# Patient Record
Sex: FEMALE | Race: WHITE | NOT HISPANIC OR LATINO | Employment: OTHER | URBAN - METROPOLITAN AREA
[De-identification: names, ages, dates, MRNs, and addresses within clinical notes are randomized per-mention and may not be internally consistent; named-entity substitution may affect disease eponyms.]

---

## 2023-10-13 PROBLEM — C44.719 BASAL CELL CARCINOMA OF SKIN OF LEFT LOWER LIMB, INCLUDING HIP: Status: ACTIVE | Noted: 2023-03-02

## 2023-10-13 PROBLEM — H01.131 ECZEMATOUS DERMATITIS OF RIGHT UPPER EYELID: Status: ACTIVE | Noted: 2023-03-02

## 2023-10-13 PROBLEM — L81.4 OTHER MELANIN HYPERPIGMENTATION: Status: ACTIVE | Noted: 2023-03-02

## 2023-10-13 PROBLEM — L02.828 FURUNCLE OF OTHER SITES: Status: ACTIVE | Noted: 2023-03-02

## 2023-10-13 PROBLEM — L91.8 OTHER HYPERTROPHIC DISORDERS OF THE SKIN: Status: ACTIVE | Noted: 2023-03-02

## 2023-10-13 PROBLEM — L57.0 ACTINIC KERATOSIS: Status: ACTIVE | Noted: 2023-03-02

## 2023-10-13 PROBLEM — D18.01 HEMANGIOMA OF SKIN AND SUBCUTANEOUS TISSUE: Status: ACTIVE | Noted: 2023-03-02

## 2023-10-13 PROBLEM — L82.1 OTHER SEBORRHEIC KERATOSIS: Status: ACTIVE | Noted: 2023-03-02

## 2023-10-13 PROBLEM — Z85.828 PERSONAL HISTORY OF OTHER MALIGNANT NEOPLASM OF SKIN: Status: ACTIVE | Noted: 2023-03-02

## 2023-10-13 PROBLEM — D23.9 OTHER BENIGN NEOPLASM OF SKIN, UNSPECIFIED: Status: ACTIVE | Noted: 2023-03-02

## 2023-10-13 PROBLEM — Z85.820 PERSONAL HISTORY OF MALIGNANT MELANOMA OF SKIN: Status: ACTIVE | Noted: 2023-03-02

## 2023-10-13 PROBLEM — D49.2 NEOPLASM OF UNSPECIFIED BEHAVIOR OF BONE, SOFT TISSUE, AND SKIN: Status: ACTIVE | Noted: 2023-03-02

## 2023-10-13 PROBLEM — D48.5 NEOPLASM OF UNCERTAIN BEHAVIOR OF SKIN: Status: ACTIVE | Noted: 2023-03-02

## 2023-10-13 PROBLEM — L90.5 SCAR CONDITION AND FIBROSIS OF SKIN: Status: ACTIVE | Noted: 2023-03-02

## 2023-10-13 PROBLEM — L82.0 INFLAMED SEBORRHEIC KERATOSIS: Status: ACTIVE | Noted: 2023-03-02

## 2023-10-13 RX ORDER — HYDROCORTISONE 25 MG/G
1 CREAM TOPICAL
COMMUNITY
Start: 2021-09-14

## 2023-10-13 RX ORDER — TACROLIMUS 1 MG/G
1 OINTMENT TOPICAL
COMMUNITY
Start: 2019-05-07

## 2023-10-16 ENCOUNTER — OFFICE VISIT (OUTPATIENT)
Dept: DERMATOLOGY | Facility: CLINIC | Age: 78
End: 2023-10-16
Payer: MEDICARE

## 2023-10-16 DIAGNOSIS — Z12.83 SCREENING EXAM FOR SKIN CANCER: ICD-10-CM

## 2023-10-16 DIAGNOSIS — Z12.83 ENCOUNTER FOR SCREENING FOR MALIGNANT NEOPLASM OF SKIN: Primary | ICD-10-CM

## 2023-10-16 DIAGNOSIS — L81.1 MELASMA: ICD-10-CM

## 2023-10-16 DIAGNOSIS — L82.1 SEBORRHEIC KERATOSIS: ICD-10-CM

## 2023-10-16 DIAGNOSIS — L81.4 LENTIGO: ICD-10-CM

## 2023-10-16 DIAGNOSIS — D18.01 HEMANGIOMA OF SKIN: ICD-10-CM

## 2023-10-16 PROCEDURE — 99214 OFFICE O/P EST MOD 30 MIN: CPT | Performed by: NURSE PRACTITIONER

## 2023-10-17 NOTE — PROGRESS NOTES
Subjective     Gail L Mendelson is a 78 y.o. female who presents for the following: Skin Check (Presents to office today for FBSE. Denies pain, itching or bleeding. PMHX significant for MM and NMSC. No further complaints.).     Review of Systems:  No other skin or systemic complaints other than what is documented elsewhere in the note.    The following portions of the chart were reviewed this encounter and updated as appropriate:          Skin Cancer History  No skin cancer on file.      Specialty Problems          Dermatology Problems    Actinic keratosis    Basal cell carcinoma of skin of left lower limb, including hip    Eczematous dermatitis of right upper eyelid    Hemangioma of skin and subcutaneous tissue    Inflamed seborrheic keratosis    Neoplasm of uncertain behavior of skin    Other benign neoplasm of skin, unspecified    Other hypertrophic disorders of the skin    Other melanin hyperpigmentation    Other seborrheic keratosis    Personal history of malignant melanoma of skin    Personal history of other malignant neoplasm of skin    Scar condition and fibrosis of skin        Objective   Well appearing patient in no apparent distress; mood and affect are within normal limits.    A focused skin examination was performed. All findings within normal limits unless otherwise noted below.    Assessment/Plan   1. Encounter for screening for malignant neoplasm of skin    Related Procedures  Follow Up In Dermatology - Established Patient    2. Screening exam for skin cancer  Scattered benign lesions. Scar(s) clear no sign of recurrence.     No evidence of recurrence in scar and benign ROS.   Continue with regular total body skin exams.  ABCDEs of melanoma and atypical moles were discussed with the patient.  Patient was instructed to perform monthly self skin examination.  We recommended that the patient have regular full skin exams given an increased risk of subsequent skin cancers.  The patient was instructed to  use sun protective behaviors including use of broad spectrum sunscreens and sun protective clothing to reduce risk of skin cancers.    3. Seborrheic keratosis  Stuck on verrucous, tan-brown papules and plaques.      Although Seborrheic Keratoses can be troublesome and unsightly, they are entirely benign.  Removal of Seborrheic Keratoses is considered a cosmetic procedure. Removal is typically performed using liquid nitrogen cryotherapy.  Treatment of current lesions does not prevent the development of new Seborrheic Keratoses in the future.    4. Hemangioma of skin  Scattered cherry-red papule(s).    - A cherry hemangioma is a small macule (small, flat, smooth area) or papule (small, solid bump) formed from an overgrowth of tiny blood vessels in the skin. Cherry hemangiomas are characteristically red or purplish in color. They often first appear in middle adulthood and usually increase in number with age. Cherry hemangiomas are noncancerous (benign) and are common in adults.  - Try to avoid trauma to the lesion(s), which may cause irritation and bleeding of the area.  - If cosmetically bothersome or if they cause irritation or bleeding, cherry hemangiomas may be removed by cutting away the area (excision), burning away the area (electrocautery), freezing the area (cryosurgery), or via laser therapy.    5. Lentigo  Scattered tan macules in sun-exposed areas.    - A solar lentigo (plural, solar lentigines), also known as a sun-induced freckle or senile lentigo, is a dark (hyperpigmented) lesion caused by natural or artificial ultraviolet (UV) light. Solar lentigines may be single or multiple.   - Solar lentigines typically appear on areas exposed to natural or artificial UV light. They appear as well-defined, light brown to black, flat spots.   - Solar lentigines are benign, but they do indicate excessive sun exposure, a risk factor for the development of skin cancer.    6. Melasma  Left Melolabial Fold, Left Upper  Cutaneous Lip, Right Melolabial Fold, Right Upper Cutaneous Lip  Tan, evenly pigmented macules and patches    MELASMA  - This condition is a splotchy light or dark brown discoloration that occurs on the face and neck - most commonly on the cheeks, forehead, upper lip, and chin. It tends to occur in women who are pregnant or taking oral contraceptive pills and who live in [place] areas. However, it may occur in the absence of these factors, and it is sometimes seen in men. People with darker skin tone are more likely to develop melasma. The pigment develops slowly over time, with no signs of inflammation or irritation. The exact cause is unknown but hereditary, hormones, and sunlight exposure are certainly important factors. Cosmetics probably do not play a role.   - If melasma occurs in association with pregnancy or oral contraceptive pills, it may gradually fade after delivery or discontinuation of the pills. Unfortunately, in many women, it may take many months to improve and may not go away completely.   Treatment options:       - Sunscreen: The single most important thing you can do is protecting yourself from the sun. Sunlight will gradually cause darkening of the pigment.        - Bleaching cream: You may be prescribed a bleaching cream containing hydroquinone to help lighten the skin. Hydroquinone may cause some initial irritation when you first use the product, so start slowly- apply a small amount to the affected area on Monday and Friday at night, if you don’t problems after one week, increase to every other night. Eventually work your way to every night. Improvements may not be noticeable for several months, so be patient. It is important to continue sun protection when using these products, as any sunlight will likely darken your melasma and negate any improvement from the bleaching cream.        - Combination creams: Occasionally a combination cream containing hydroquinone and other ingredients such as  tretinoin, corticosteroids, or glycolic acid may be prescribed to enhance the skin-lightening effects. As with bleaching cream above, start slowly and continue to apply sunscreen during the day.        - Chemical peels: If bleaching cream is not effective or you desire quicker results, you may consider a series of chemical peels. Chemical peels are very effective for melasma but these procedures should be performed by a dermatologist. Complications such as worsening pigmentation can occur when the procedures are not performed properly and tailored to the patient’s skin type.        - Laser treatment: For patients who desire even quicker and more dramatic results, laser treatment with Intense Pulsed Light (IPL) and Fraxel have been shown to be effective. Multiple treatments are usually necessary. These treatments are associated with3-7 days recovery period.     Plan  - Start bleaching cream ([place] compound). Use as directed.   - Risks, benefits, side effects, alternatives and options were discussed with patient and the patient voiced understanding.

## 2024-04-15 ENCOUNTER — OFFICE VISIT (OUTPATIENT)
Dept: DERMATOLOGY | Facility: CLINIC | Age: 79
End: 2024-04-15
Payer: MEDICARE

## 2024-04-15 DIAGNOSIS — L81.4 LENTIGO: ICD-10-CM

## 2024-04-15 DIAGNOSIS — D18.01 HEMANGIOMA OF SKIN: ICD-10-CM

## 2024-04-15 DIAGNOSIS — Z12.83 SCREENING EXAM FOR SKIN CANCER: ICD-10-CM

## 2024-04-15 DIAGNOSIS — L91.8 SKIN TAG: ICD-10-CM

## 2024-04-15 DIAGNOSIS — L82.1 SEBORRHEIC KERATOSIS: ICD-10-CM

## 2024-04-15 DIAGNOSIS — Z12.83 ENCOUNTER FOR SCREENING FOR MALIGNANT NEOPLASM OF SKIN: Primary | ICD-10-CM

## 2024-04-15 PROCEDURE — 99213 OFFICE O/P EST LOW 20 MIN: CPT | Performed by: NURSE PRACTITIONER

## 2024-04-15 PROCEDURE — 1159F MED LIST DOCD IN RCRD: CPT | Performed by: NURSE PRACTITIONER

## 2024-04-15 PROCEDURE — 1160F RVW MEDS BY RX/DR IN RCRD: CPT | Performed by: NURSE PRACTITIONER

## 2024-04-15 NOTE — PROGRESS NOTES
Subjective     Gail L Mendelson is a 78 y.o. female who presents for the following: Skin Check (Patient presents to office today for FBSE. PMHX NMSC, MM. No complaints.).     Review of Systems:  No other skin or systemic complaints other than what is documented elsewhere in the note.    The following portions of the chart were reviewed this encounter and updated as appropriate:   Tobacco  Allergies  Meds  Problems  Med Hx  Surg Hx  Fam Hx         Skin Cancer History  No skin cancer on file.      Specialty Problems          Dermatology Problems    Actinic keratosis    Basal cell carcinoma of skin of left lower limb, including hip    Hemangioma of skin and subcutaneous tissue    Inflamed seborrheic keratosis    Neoplasm of uncertain behavior of skin    Other benign neoplasm of skin, unspecified    Other hypertrophic disorders of the skin    Other melanin hyperpigmentation    Other seborrheic keratosis    Personal history of malignant melanoma of skin    Personal history of other malignant neoplasm of skin    Scar condition and fibrosis of skin        Objective   Well appearing patient in no apparent distress; mood and affect are within normal limits.    A focused skin examination was performed. All findings within normal limits unless otherwise noted below.    Assessment/Plan   1. Encounter for screening for malignant neoplasm of skin    Related Procedures  Follow Up In Dermatology - Established Patient    2. Screening exam for skin cancer  Scattered benign lesions. Scar(s) clear no sign of recurrence.     No evidence of recurrence in scar and benign ROS.   Continue with regular total body skin exams.  ABCDEs of melanoma and atypical moles were discussed with the patient.  Patient was instructed to perform monthly self skin examination.  We recommended that the patient have regular full skin exams given an increased risk of subsequent skin cancers.  The patient was instructed to use sun protective behaviors  including use of broad spectrum sunscreens and sun protective clothing to reduce risk of skin cancers.    3. Seborrheic keratosis  Stuck on verrucous, tan-brown papules and plaques.      Although Seborrheic Keratoses can be troublesome and unsightly, they are entirely benign.  Removal of Seborrheic Keratoses is considered a cosmetic procedure. Removal is typically performed using liquid nitrogen cryotherapy.  Treatment of current lesions does not prevent the development of new Seborrheic Keratoses in the future.    4. Hemangioma of skin  Violaceous/red papule with maroon lagoons     - A cherry hemangioma is a small macule (small, flat, smooth area) or papule (small, solid bump) formed from an overgrowth of tiny blood vessels in the skin. Cherry hemangiomas are characteristically red or purplish in color. They often first appear in middle adulthood and usually increase in number with age. Cherry hemangiomas are noncancerous (benign) and are common in adults.  - Lesions are benign, reassured patient.     5. Lentigo  Scattered tan macules in sun-exposed areas.    A solar lentigo (plural, solar lentigines), sometimes called an age spot or liver spot, is a brown macule (small, flat, smooth area of skin) caused by chronic sun or artificial ultraviolet (UV) light exposure. There may be just one lentigo or there may be multiple. This type of lentigo is different from lentigo simplex (discussed separately) because it is caused by exposure to UV light. Solar lentigines are benign, but they do indicate excessive sun exposure, a risk factor for the development of skin cancer.  Lesions are benign, no treatment needed.     6. Skin tag  Neck - Anterior  Fleshy, skin-colored sessile and pedunculated papules.     Acrochordon (skin tag)  - The benign nature of the lesion was discussed with patient.   - A skin tag (acrochordon) is a common, possibly inherited condition that manifests as small, flesh-colored growths on a thin stalk.  Skin tags are benign lesions that can sometimes become irritated or traumatized.  - Skin tags are very common, and their incidence increases with age. Seen more often in people with growth hormone excess (acromegaly).   - Skin tags are most commonly found on the eyelids, neck, armpits, and groin area. They are flesh-colored growths on a thin stalk, ranging in size from small to large.  -Discussed nature of condition  -Multiple treatments in office are often needed  -Diligent at home therapy is often needed  -Cryotherapy today  -Possible side effects of liquid nitrogen treatment reviewed including formation of blisters, crusting, tenderness, scar, and discoloration which may be permanent.  -Patient advised to return the office for re-evaluation if the treated lesion(s) do not resolve within 4-6 weeks. Patient verbalizes understanding.

## 2024-10-07 ENCOUNTER — APPOINTMENT (OUTPATIENT)
Dept: DERMATOLOGY | Facility: CLINIC | Age: 79
End: 2024-10-07
Payer: MEDICARE

## 2024-10-07 DIAGNOSIS — L82.1 SEBORRHEIC KERATOSIS: ICD-10-CM

## 2024-10-07 DIAGNOSIS — L81.6 POIKILODERMA: ICD-10-CM

## 2024-10-07 DIAGNOSIS — L81.4 LENTIGO: ICD-10-CM

## 2024-10-07 DIAGNOSIS — Z12.83 ENCOUNTER FOR SCREENING FOR MALIGNANT NEOPLASM OF SKIN: Primary | ICD-10-CM

## 2024-10-07 DIAGNOSIS — D18.01 HEMANGIOMA OF SKIN: ICD-10-CM

## 2024-10-07 PROCEDURE — 99213 OFFICE O/P EST LOW 20 MIN: CPT | Performed by: NURSE PRACTITIONER

## 2024-10-07 PROCEDURE — 1160F RVW MEDS BY RX/DR IN RCRD: CPT | Performed by: NURSE PRACTITIONER

## 2024-10-07 PROCEDURE — 1159F MED LIST DOCD IN RCRD: CPT | Performed by: NURSE PRACTITIONER

## 2024-10-07 NOTE — PROGRESS NOTES
Subjective   Gail L Mendelson is a 79 y.o. female who presents for the following: Skin Check.  Skin Cancer Screening  She has a history of moderate sun exposure. She is in the sun occasionally. She uses sunscreen occasionally. She reports no skin symptoms. Her moles are not changing.    PMHX MM 1997.        Objective   Well appearing patient in no apparent distress; mood and affect are within normal limits.    A full examination was performed including scalp, head, eyes, ears, nose, lips, neck, chest, axillae, abdomen, back, buttocks, bilateral upper extremities, bilateral lower extremities, hands, feet, fingers, toes, fingernails, and toenails. All findings within normal limits unless otherwise noted below.    Scattered benign lesions    Generalized, Head - Anterior (Face)  Stuck on verrucous, tan-brown papules and plaques.      Violaceous/red papule with maroon lagoons     Scattered tan macules in sun-exposed areas.    Chest (Upper Torso, Anterior)  Mottled pigmentation and erythema on the lateral and anterior neck and the superior chest with fine telangiectasias.       Assessment/Plan   Encounter for screening for malignant neoplasm of skin    - Protective measures, such as avoiding skin exposure to sunlight during peak sun hours (10 AM to 3 PM), wearing protective clothing, and applying high-SPF sunscreen, are essential for reducing exposure to harmful ultraviolet (UV) light.  - Monthly self-examination of the skin is helpful to detect new lesions or changes in existing lesions.  - Discussed signs and symptoms of sun-related skin cancers.   - Make sure your moles are not signs of skin cancer (melanoma). Remember the ABCDEs of melanoma lesions:  A - Asymmetry: One half of the lesion does not mirror the other half.  B - Border: The borders are irregular or vague (indistinct).  C - Color: More than one color may be noted within the mole.  D - Diameter: Size greater than 6 mm (roughly the size of a pencil eraser)  may be concerning.  E - Evolving: Notable changes in the lesion over time are suspicious signs for skin cancer.    Seborrheic keratosis (2)  Head - Anterior (Face); Generalized    Although Seborrheic Keratoses can be troublesome and unsightly, they are entirely benign.  Removal of Seborrheic Keratoses is considered a cosmetic procedure. Removal is typically performed using liquid nitrogen cryotherapy.  Treatment of current lesions does not prevent the development of new Seborrheic Keratoses in the future.    Hemangioma of skin    - A cherry hemangioma is a small macule (small, flat, smooth area) or papule (small, solid bump) formed from an overgrowth of tiny blood vessels in the skin. Cherry hemangiomas are characteristically red or purplish in color. They often first appear in middle adulthood and usually increase in number with age. Cherry hemangiomas are noncancerous (benign) and are common in adults.  - Lesions are benign, reassured patient.     Lentigo    A solar lentigo (plural, solar lentigines), sometimes called an age spot or liver spot, is a brown macule (small, flat, smooth area of skin) caused by chronic sun or artificial ultraviolet (UV) light exposure. There may be just one lentigo or there may be multiple. This type of lentigo is different from lentigo simplex (discussed separately) because it is caused by exposure to UV light. Solar lentigines are benign, but they do indicate excessive sun exposure, a risk factor for the development of skin cancer.  Lesions are benign, no treatment needed.     Poikiloderma  Chest (Upper Torso, Anterior)    Poikiloderma of [place] is a common, long-lasting skin condition in adults with lighter skin colors. It is thought to be caused by long-term sun exposure. Poikiloderma of [place] appears as color changes in the skin due to sun damage to the neck and sometimes the upper-middle area of the chest.  To help prevent this condition from occurring or worsening, use  sunscreens, wear sun-protective clothing, and avoid peak sun hours (between 10 AM and 3 PM).    Follow up in 6 months for a total body skin exam. Please call me if there are any changes or development of concerning symptoms (lesion/skin condition is changing, bleeding, enlarging, or worsening).

## 2025-04-07 ENCOUNTER — APPOINTMENT (OUTPATIENT)
Dept: DERMATOLOGY | Facility: CLINIC | Age: 80
End: 2025-04-07
Payer: MEDICARE

## 2025-04-07 DIAGNOSIS — L81.4 LENTIGO: ICD-10-CM

## 2025-04-07 DIAGNOSIS — D18.01 HEMANGIOMA OF SKIN: ICD-10-CM

## 2025-04-07 DIAGNOSIS — L82.1 SEBORRHEIC KERATOSIS: ICD-10-CM

## 2025-04-07 DIAGNOSIS — L91.8 SKIN TAG: ICD-10-CM

## 2025-04-07 DIAGNOSIS — Z12.83 SCREENING EXAM FOR SKIN CANCER: Primary | ICD-10-CM

## 2025-04-07 PROCEDURE — 1159F MED LIST DOCD IN RCRD: CPT | Performed by: NURSE PRACTITIONER

## 2025-04-07 PROCEDURE — 99213 OFFICE O/P EST LOW 20 MIN: CPT | Performed by: NURSE PRACTITIONER

## 2025-04-07 RX ORDER — GABAPENTIN 600 MG/1
TABLET ORAL
COMMUNITY

## 2025-04-07 RX ORDER — FAMOTIDINE 40 MG/1
40 TABLET, FILM COATED ORAL DAILY
COMMUNITY

## 2025-04-07 RX ORDER — ZOLPIDEM TARTRATE 5 MG/1
TABLET ORAL
COMMUNITY

## 2025-04-07 RX ORDER — VENLAFAXINE 75 MG/1
1 TABLET ORAL DAILY
COMMUNITY
Start: 2025-01-16

## 2025-04-07 NOTE — PROGRESS NOTES
Subjective     Gail L Mendelson is a 79 y.o. female who presents for the following: Skin Check (Pt presents to office for full skin exam.  Last full skin exam 10/07/2024.  Pt has history of MM in 1997.  Pt has scattered lesions of concern at this time. Chaperone offered and declined.//).     Review of Systems:  No other skin or systemic complaints other than what is documented elsewhere in the note.    The following portions of the chart were reviewed this encounter and updated as appropriate:         Skin Cancer History  No skin cancer on file.      Specialty Problems          Dermatology Problems    Actinic keratosis    Basal cell carcinoma of skin of left lower limb, including hip    Hemangioma of skin and subcutaneous tissue    Inflamed seborrheic keratosis    Neoplasm of uncertain behavior of skin    Other benign neoplasm of skin, unspecified    Other hypertrophic disorders of the skin    Other melanin hyperpigmentation    Other seborrheic keratosis    Personal history of malignant melanoma of skin    Personal history of other malignant neoplasm of skin    Scar condition and fibrosis of skin        Objective   Well appearing patient in no apparent distress; mood and affect are within normal limits.    A full examination was performed including scalp, head, eyes, ears, nose, lips, neck, chest, axillae, abdomen, back, buttocks, bilateral upper extremities, bilateral lower extremities, hands, feet, fingers, toes, fingernails, and toenails. All findings within normal limits unless otherwise noted below.    Assessment/Plan   1. Screening exam for skin cancer  Scattered benign lesions. Scar(s) clear no sign of recurrence.     No evidence of recurrence in scar and benign ROS.   Continue with regular total body skin exams.  ABCDEs of melanoma and atypical moles were discussed with the patient.  Patient was instructed to perform monthly self skin examination.  We recommended that the patient have regular full skin exams  given an increased risk of subsequent skin cancers.  The patient was instructed to use sun protective behaviors including use of broad spectrum sunscreens and sun protective clothing to reduce risk of skin cancers.    2. Seborrheic keratosis  Stuck on verrucous, tan-brown papules and plaques.      Although Seborrheic Keratoses can be troublesome and unsightly, they are entirely benign.  Removal of Seborrheic Keratoses is considered a cosmetic procedure. Removal is typically performed using liquid nitrogen cryotherapy.  Treatment of current lesions does not prevent the development of new Seborrheic Keratoses in the future.    3. Hemangioma of skin  Violaceous/red papule with maroon lagoons     - A cherry hemangioma is a small macule (small, flat, smooth area) or papule (small, solid bump) formed from an overgrowth of tiny blood vessels in the skin. Cherry hemangiomas are characteristically red or purplish in color. They often first appear in middle adulthood and usually increase in number with age. Cherry hemangiomas are noncancerous (benign) and are common in adults.  - Lesions are benign, reassured patient.     4. Lentigo  Scattered tan macules in sun-exposed areas.    A solar lentigo (plural, solar lentigines), sometimes called an age spot or liver spot, is a brown macule (small, flat, smooth area of skin) caused by chronic sun or artificial ultraviolet (UV) light exposure. There may be just one lentigo or there may be multiple. This type of lentigo is different from lentigo simplex (discussed separately) because it is caused by exposure to UV light. Solar lentigines are benign, but they do indicate excessive sun exposure, a risk factor for the development of skin cancer.  Lesions are benign, no treatment needed.     5. Skin tag  Neck - Anterior  Fleshy, skin-colored sessile and pedunculated papules.     Acrochordon (skin tag)  - The benign nature of the lesion was discussed with patient.   - A skin tag  (acrochordon) is a common, possibly inherited condition that manifests as small, flesh-colored growths on a thin stalk. Skin tags are benign lesions that can sometimes become irritated or traumatized.  - Skin tags are very common, and their incidence increases with age. Seen more often in people with growth hormone excess (acromegaly).   - Skin tags are most commonly found on the eyelids, neck, armpits, and groin area. They are flesh-colored growths on a thin stalk, ranging in size from small to large.        Follow up in 6 months for a total body skin exam. Please call me if there are any changes or development of concerning symptoms (lesion/skin condition is changing, bleeding, enlarging, or worsening).

## 2025-08-29 ASSESSMENT — DERMATOLOGY QUALITY OF LIFE (QOL) ASSESSMENT
RATE HOW EMOTIONALLY BOTHERED YOU ARE BY YOUR SKIN PROBLEM (FOR EXAMPLE, WORRY, EMBARRASSMENT, FRUSTRATION): 5
RATE HOW BOTHERED YOU ARE BY SYMPTOMS OF YOUR SKIN PROBLEM (EG, ITCHING, STINGING BURNING, HURTING OR SKIN IRRITATION): 3
WHAT SINGLE SKIN CONDITION LISTED BELOW IS THE PATIENT ANSWERING THE QUALITY-OF-LIFE ASSESSMENT QUESTIONS ABOUT: NONE OF THE ABOVE
WHAT SINGLE SKIN CONDITION LISTED BELOW IS THE PATIENT ANSWERING THE QUALITY-OF-LIFE ASSESSMENT QUESTIONS ABOUT: NONE OF THE ABOVE
RATE HOW BOTHERED YOU ARE BY SYMPTOMS OF YOUR SKIN PROBLEM (EG, ITCHING, STINGING BURNING, HURTING OR SKIN IRRITATION): 3
RATE HOW BOTHERED YOU ARE BY EFFECTS OF YOUR SKIN PROBLEMS ON YOUR ACTIVITIES (EG, GOING OUT, ACCOMPLISHING WHAT YOU WANT, WORK ACTIVITIES OR YOUR RELATIONSHIPS WITH OTHERS): 0 - NEVER BOTHERED
RATE HOW BOTHERED YOU ARE BY EFFECTS OF YOUR SKIN PROBLEMS ON YOUR ACTIVITIES (EG, GOING OUT, ACCOMPLISHING WHAT YOU WANT, WORK ACTIVITIES OR YOUR RELATIONSHIPS WITH OTHERS): 0 - NEVER BOTHERED
RATE HOW EMOTIONALLY BOTHERED YOU ARE BY YOUR SKIN PROBLEM (FOR EXAMPLE, WORRY, EMBARRASSMENT, FRUSTRATION): 5

## 2025-09-04 ENCOUNTER — APPOINTMENT (OUTPATIENT)
Dept: DERMATOLOGY | Facility: CLINIC | Age: 80
End: 2025-09-04
Payer: MEDICARE

## 2025-10-06 ENCOUNTER — APPOINTMENT (OUTPATIENT)
Dept: DERMATOLOGY | Facility: CLINIC | Age: 80
End: 2025-10-06
Payer: MEDICARE

## 2026-03-05 ENCOUNTER — APPOINTMENT (OUTPATIENT)
Dept: DERMATOLOGY | Facility: CLINIC | Age: 81
End: 2026-03-05
Payer: MEDICARE